# Patient Record
Sex: FEMALE | Race: BLACK OR AFRICAN AMERICAN | Employment: FULL TIME | URBAN - NONMETROPOLITAN AREA
[De-identification: names, ages, dates, MRNs, and addresses within clinical notes are randomized per-mention and may not be internally consistent; named-entity substitution may affect disease eponyms.]

---

## 2018-05-10 ENCOUNTER — HOSPITAL ENCOUNTER (OUTPATIENT)
Dept: OTHER | Age: 33
Discharge: HOME | End: 2018-05-10
Attending: CLINIC/CENTER | Admitting: CLINIC/CENTER

## 2018-05-12 LAB
CULTURE: NORMAL
REPORT STATUS: NORMAL
REQUEST PROBLEM: NORMAL
SPECIMEN: NORMAL
TOTAL COLONY COUNT: NORMAL

## 2019-04-08 ENCOUNTER — HOSPITAL ENCOUNTER (OUTPATIENT)
Age: 34
Discharge: HOME | End: 2019-04-08
Payer: COMMERCIAL

## 2019-04-08 LAB — D DIMER: <200 NG/ML(DDU)

## 2019-04-08 PROCEDURE — 36415 COLL VENOUS BLD VENIPUNCTURE: CPT

## 2019-04-08 PROCEDURE — 85379 FIBRIN DEGRADATION QUANT: CPT

## 2019-08-09 ENCOUNTER — HOSPITAL ENCOUNTER (OUTPATIENT)
Dept: ULTRASOUND IMAGING | Age: 34
Discharge: HOME | End: 2019-08-09
Payer: COMMERCIAL

## 2019-08-09 DIAGNOSIS — R19.7 DIARRHEA, UNSPECIFIED TYPE: ICD-10-CM

## 2019-08-09 DIAGNOSIS — R14.0 ABDOMINAL DISTENTION: ICD-10-CM

## 2019-08-09 PROCEDURE — 76705 ECHO EXAM OF ABDOMEN: CPT

## 2020-02-11 ENCOUNTER — HOSPITAL ENCOUNTER (OUTPATIENT)
Age: 35
Setting detail: SPECIMEN
Discharge: HOME | End: 2020-02-11

## 2020-02-11 PROCEDURE — 86787 VARICELLA-ZOSTER ANTIBODY: CPT

## 2020-02-11 PROCEDURE — 86481 TB AG RESPONSE T-CELL SUSP: CPT

## 2020-02-11 PROCEDURE — 86765 RUBEOLA ANTIBODY: CPT

## 2020-02-11 PROCEDURE — 86762 RUBELLA ANTIBODY: CPT

## 2020-02-11 PROCEDURE — 86735 MUMPS ANTIBODY: CPT

## 2020-02-14 LAB
MUV IGG SER QL: 9.6 AU/ML
MUV IGG SER QL: NORMAL AU/ML
NIL (NEGATIVE) SPOT CONTROL: NORMAL
PANEL A SPOT COUNT: 1
PANEL B SPOT COUNT: 2
POSITIVE CONTROL SPOT COUNT: NORMAL
POSITIVE CONTROL SPOT COUNT: NORMAL
RUBELLA ANTIBODY IGG: 16.1 IU/ML
RUBELLA ANTIBODY IGG: NORMAL IU/ML
RUBEOLA (MEASLES) AB IGG: 24.8 AU/ML
RUBEOLA (MEASLES) AB IGG: NORMAL AU/ML
TB CELL IMMUNE MEASURE: NORMAL
VARICELLA-ZOSTER VIRUS AB, IGG: 500 IV
VARICELLA-ZOSTER VIRUS AB, IGG: NORMAL IV

## 2021-03-03 ENCOUNTER — HOSPITAL ENCOUNTER (OUTPATIENT)
Age: 36
Setting detail: SPECIMEN
Discharge: HOME | End: 2021-03-03
Payer: COMMERCIAL

## 2021-03-03 PROCEDURE — U0002 COVID-19 LAB TEST NON-CDC: HCPCS

## 2021-03-03 PROCEDURE — C9803 HOPD COVID-19 SPEC COLLECT: HCPCS

## 2021-03-04 LAB
SARS-COV-2: NOT DETECTED
SOURCE: NORMAL

## 2021-03-30 ENCOUNTER — HOSPITAL ENCOUNTER (EMERGENCY)
Age: 36
Discharge: HOME | End: 2021-03-30
Attending: EMERGENCY MEDICINE
Payer: COMMERCIAL

## 2021-03-30 ENCOUNTER — NURSE TRIAGE (OUTPATIENT)
Dept: OTHER | Facility: CLINIC | Age: 36
End: 2021-03-30

## 2021-03-30 VITALS
HEIGHT: 64 IN | RESPIRATION RATE: 18 BRPM | SYSTOLIC BLOOD PRESSURE: 132 MMHG | TEMPERATURE: 98.1 F | OXYGEN SATURATION: 98 % | DIASTOLIC BLOOD PRESSURE: 84 MMHG | WEIGHT: 293 LBS | HEART RATE: 88 BPM | BODY MASS INDEX: 50.02 KG/M2

## 2021-03-30 DIAGNOSIS — S39.012A BACK STRAIN, INITIAL ENCOUNTER: Primary | ICD-10-CM

## 2021-03-30 PROCEDURE — 99285 EMERGENCY DEPT VISIT HI MDM: CPT

## 2021-03-30 PROCEDURE — 6370000000 HC RX 637 (ALT 250 FOR IP): Performed by: EMERGENCY MEDICINE

## 2021-03-30 RX ORDER — LIDOCAINE 4 G/G
1 PATCH TOPICAL DAILY
Status: DISCONTINUED | OUTPATIENT
Start: 2021-03-30 | End: 2021-03-30 | Stop reason: HOSPADM

## 2021-03-30 RX ORDER — LIDOCAINE 50 MG/G
1 PATCH TOPICAL DAILY
Qty: 10 PATCH | Refills: 1 | Status: SHIPPED | OUTPATIENT
Start: 2021-03-30

## 2021-03-30 ASSESSMENT — PAIN DESCRIPTION - PAIN TYPE: TYPE: ACUTE PAIN

## 2021-03-30 ASSESSMENT — PAIN SCALES - GENERAL: PAINLEVEL_OUTOF10: 9

## 2021-03-30 ASSESSMENT — PAIN DESCRIPTION - FREQUENCY: FREQUENCY: INTERMITTENT

## 2021-03-30 ASSESSMENT — PAIN DESCRIPTION - LOCATION: LOCATION: BACK

## 2021-03-30 ASSESSMENT — ENCOUNTER SYMPTOMS: BACK PAIN: 1

## 2021-03-30 ASSESSMENT — PAIN DESCRIPTION - DESCRIPTORS: DESCRIPTORS: ACHING

## 2021-03-30 NOTE — ED PROVIDER NOTES
Triage Chief Complaint:   Back Injury (helping a residence get dressed and felt a \"pull\" in lower back. Occurred at approx 6:30 am this morning.Xin. )    [place]:  Alisha Fernando is a 39 y.o. female that presents to the ED with acute low back pain. She states she was helping a resident get dressed when she pulled her back. Happened at 630 this morning. She checks in late this afternoon. Patient does have intermittent low back discomfort and sciatica currently she is having no sciatica no radiculopathy numbness tingling weakness no falls injury trauma no red flags. She actually points to her right mid T-spine right paraspinal region. Back Pain: RED FLAGS  Unexplained weigh loss: absent  Current infection: absent  Immunosuppression: absent  Fracture or suspected fracture: absent  MVA with suspicion of fracture: absent  Major Fall with suspicion of fracture: absent  Saddle anesthesia: absent  Recent onset bladder dysfunction: absent  Recent onset fecal incontinence: absent  Major motor weakness: absent  History of cancer: absent         Past Medical History:   Diagnosis Date    Asthma      History reviewed. No pertinent surgical history. History reviewed. No pertinent family history.   Social History     Socioeconomic History    Marital status: Single     Spouse name: Not on file    Number of children: Not on file    Years of education: Not on file    Highest education level: Not on file   Occupational History    Not on file   Social Needs    Financial resource strain: Not on file    Food insecurity     Worry: Not on file     Inability: Not on file    Transportation needs     Medical: Not on file     Non-medical: Not on file   Tobacco Use    Smoking status: Current Every Day Smoker     Packs/day: 0.50     Types: Cigarettes    Smokeless tobacco: Never Used   Substance and Sexual Activity    Alcohol use: No    Drug use: No    Sexual activity: Yes     Partners: Male   Lifestyle    Physical Head: Normocephalic and atraumatic. Eyes:      Pupils: Pupils are equal, round, and reactive to light. Neck:      Musculoskeletal: Normal range of motion and neck supple. Musculoskeletal:      Cervical back: Normal.      Thoracic back: She exhibits decreased range of motion and tenderness. She exhibits no bony tenderness, no pain and no spasm. Lumbar back: Normal.   Skin:     General: Skin is warm and dry. Neurological:      Mental Status: She is alert and oriented to person, place, and time. Cranial Nerves: Cranial nerves are intact. Sensory: Sensation is intact. Motor: Motor function is intact. Coordination: Coordination is intact. Gait: Gait is intact. Deep Tendon Reflexes:      Reflex Scores:       Patellar reflexes are 2+ on the right side. Achilles reflexes are 2+ on the right side and 2+ on the left side. I have reviewed and interpreted all of the currently available lab results from this visit (ifapplicable):  No results found for this visit on 03/30/21. Radiographs (if obtained):  [] The following radiograph wasinterpreted by myself in the absence of a radiologist:   [] Radiologist's Report Reviewed:  No orders to display         EKG (if obtained): (All EKG's are interpreted by myself in the absence of a cardiologist)    Chart review shows recent radiographs:  No results found. MDM:      Patient has acute pain in her right mid to low paraspinal T-spine region. There is no midline tenderness. Reflexes are normal no clonus no radiculopathy intact sensation full motor testing L1 S2 is normal.  I recommend a short course of rest ice topical Lidoderm patch 1 dose in the ED and a prescription to go home and follow-up with our health           Clinical Impression:  1.  Back strain, initial encounter      Disposition referral (if applicable):  Marci Cuevas DO  [address] [place] [address]  [phone]    Schedule an appointment as soon as possible

## 2021-03-30 NOTE — ED NOTES
Pt tearful about returning to work. Discussed discharge information and follow up with occupational health, discussed prescriptions. Pt verbalized understanding.       Angie Beck, RN  03/30/21 9291

## 2023-03-19 ENCOUNTER — HOSPITAL ENCOUNTER (EMERGENCY)
Age: 38
Discharge: HOME | End: 2023-03-19
Attending: EMERGENCY MEDICINE
Payer: COMMERCIAL

## 2023-03-19 VITALS
WEIGHT: 240 LBS | BODY MASS INDEX: 42.52 KG/M2 | HEIGHT: 63 IN | RESPIRATION RATE: 18 BRPM | SYSTOLIC BLOOD PRESSURE: 168 MMHG | OXYGEN SATURATION: 98 % | TEMPERATURE: 98.4 F | HEART RATE: 99 BPM | DIASTOLIC BLOOD PRESSURE: 113 MMHG

## 2023-03-19 DIAGNOSIS — S16.1XXA STRAIN OF NECK MUSCLE, INITIAL ENCOUNTER: Primary | ICD-10-CM

## 2023-03-19 PROCEDURE — 6360000002 HC RX W HCPCS: Performed by: EMERGENCY MEDICINE

## 2023-03-19 PROCEDURE — 99284 EMERGENCY DEPT VISIT MOD MDM: CPT

## 2023-03-19 PROCEDURE — 6370000000 HC RX 637 (ALT 250 FOR IP): Performed by: EMERGENCY MEDICINE

## 2023-03-19 PROCEDURE — 96372 THER/PROPH/DIAG INJ SC/IM: CPT

## 2023-03-19 RX ORDER — KETOROLAC TROMETHAMINE 30 MG/ML
30 INJECTION, SOLUTION INTRAMUSCULAR; INTRAVENOUS ONCE
Status: COMPLETED | OUTPATIENT
Start: 2023-03-19 | End: 2023-03-19

## 2023-03-19 RX ORDER — CYCLOBENZAPRINE HCL 10 MG
10 TABLET ORAL ONCE
Status: COMPLETED | OUTPATIENT
Start: 2023-03-19 | End: 2023-03-19

## 2023-03-19 RX ORDER — CYCLOBENZAPRINE HCL 10 MG
10 TABLET ORAL 3 TIMES DAILY PRN
Qty: 10 TABLET | Refills: 0 | Status: SHIPPED | OUTPATIENT
Start: 2023-03-19

## 2023-03-19 RX ORDER — ACETAMINOPHEN 500 MG
1000 TABLET ORAL ONCE
Status: COMPLETED | OUTPATIENT
Start: 2023-03-19 | End: 2023-03-19

## 2023-03-19 RX ADMIN — ACETAMINOPHEN 1000 MG: 500 TABLET ORAL at 19:34

## 2023-03-19 RX ADMIN — CYCLOBENZAPRINE 10 MG: 10 TABLET, FILM COATED ORAL at 19:34

## 2023-03-19 RX ADMIN — KETOROLAC TROMETHAMINE 30 MG: 30 INJECTION, SOLUTION INTRAMUSCULAR; INTRAVENOUS at 19:33

## 2023-03-19 ASSESSMENT — PAIN SCALES - GENERAL: PAINLEVEL_OUTOF10: 7

## 2023-03-19 NOTE — ED PROVIDER NOTES
Triage Chief Complaint:   Neck Pain (Since Monday NKI. Pt repotrs turning neck and feeling a \"crunch. \" )    [place]:  Judge Lucero is a 45 y.o. female that presents with left posterior neck pain. The patient states that she turned her neck 5 days ago and felt a crunch in the left posterior portion of her neck with pain. States that since then she has had pain with any type of movement of her neck especially with leftward rotation. Denies vision changes, motor or sensory deficits, headache or back pain. States that she felt somewhat lightheaded this morning but that resolved. No other acute complaints. She has been taking ibuprofen at home. ROS:  At least 10 systems reviewed and otherwise acutely negative except as in the [address] Past Medical History:   Diagnosis Date    Asthma      History reviewed. No pertinent surgical history. History reviewed. No pertinent family history. Social History     Socioeconomic History    Marital status: Single     Spouse name: Not on file    Number of children: Not on file    Years of education: Not on file    Highest education level: Not on file   Occupational History    Not on file   Tobacco Use    Smoking status: Every Day     Packs/day: 0.50     Types: Cigarettes    Smokeless tobacco: Never   Vaping Use    Vaping Use: Never used   Substance and Sexual Activity    Alcohol use: No    Drug use: No    Sexual activity: Yes     Partners: Male   Other Topics Concern    Not on file   Social History Narrative    Not on file     Social Determinants of Health     Financial Resource Strain: Not on file   Food Insecurity: Not on file   Transportation Needs: Not on file   Physical Activity: Not on file   Stress: Not on file   Social Connections: Not on file   Intimate Partner Violence: Not on file   Housing Stability: Not on file     No current facility-administered medications for this encounter.      Current Outpatient Medications   Medication Sig Dispense Refill

## 2023-08-21 ENCOUNTER — HOSPITAL ENCOUNTER (OUTPATIENT)
Dept: GENERAL RADIOLOGY | Age: 38
Discharge: HOME | End: 2023-08-21
Payer: COMMERCIAL

## 2023-08-21 ENCOUNTER — HOSPITAL ENCOUNTER (OUTPATIENT)
Age: 38
Discharge: HOME | End: 2023-08-21
Payer: COMMERCIAL

## 2023-08-21 DIAGNOSIS — M54.9 DORSALGIA: ICD-10-CM

## 2023-08-21 DIAGNOSIS — M41.9 SCOLIOSIS, UNSPECIFIED SCOLIOSIS TYPE, UNSPECIFIED SPINAL REGION: ICD-10-CM

## 2023-08-21 DIAGNOSIS — M25.559 HIP PAIN, UNSPECIFIED LATERALITY: ICD-10-CM

## 2023-08-21 DIAGNOSIS — M25.552 HIP PAIN, LEFT: ICD-10-CM

## 2023-08-21 DIAGNOSIS — M25.551 HIP PAIN, RIGHT: ICD-10-CM

## 2023-08-21 PROCEDURE — 73502 X-RAY EXAM HIP UNI 2-3 VIEWS: CPT

## 2023-08-21 PROCEDURE — 72100 X-RAY EXAM L-S SPINE 2/3 VWS: CPT

## 2023-08-21 PROCEDURE — 72072 X-RAY EXAM THORAC SPINE 3VWS: CPT

## 2023-08-21 PROCEDURE — 72040 X-RAY EXAM NECK SPINE 2-3 VW: CPT

## 2023-09-13 ENCOUNTER — APPOINTMENT (OUTPATIENT)
Dept: CT IMAGING | Age: 38
End: 2023-09-13
Attending: EMERGENCY MEDICINE
Payer: COMMERCIAL

## 2023-09-13 ENCOUNTER — HOSPITAL ENCOUNTER (EMERGENCY)
Age: 38
Discharge: HOME | End: 2023-09-13
Attending: EMERGENCY MEDICINE
Payer: COMMERCIAL

## 2023-09-13 VITALS
RESPIRATION RATE: 14 BRPM | BODY MASS INDEX: 43.4 KG/M2 | SYSTOLIC BLOOD PRESSURE: 137 MMHG | DIASTOLIC BLOOD PRESSURE: 87 MMHG | OXYGEN SATURATION: 97 % | HEART RATE: 81 BPM | WEIGHT: 245 LBS | TEMPERATURE: 98.6 F

## 2023-09-13 DIAGNOSIS — R42 VERTIGO: Primary | ICD-10-CM

## 2023-09-13 LAB
ALBUMIN SERPL-MCNC: 4.1 GM/DL (ref 3.4–5)
ALP BLD-CCNC: 83 IU/L (ref 40–129)
ALT SERPL-CCNC: 15 U/L (ref 10–40)
ANION GAP SERPL CALCULATED.3IONS-SCNC: 8 MMOL/L (ref 4–16)
AST SERPL-CCNC: 15 IU/L (ref 15–37)
BASOPHILS ABSOLUTE: 0 K/CU MM
BASOPHILS RELATIVE PERCENT: 0.3 % (ref 0–1)
BILIRUB SERPL-MCNC: 0.2 MG/DL (ref 0–1)
BILIRUBIN URINE: NEGATIVE MG/DL
BLOOD, URINE: NEGATIVE
BUN SERPL-MCNC: 14 MG/DL (ref 6–23)
CALCIUM SERPL-MCNC: 9 MG/DL (ref 8.3–10.6)
CHLORIDE BLD-SCNC: 106 MMOL/L (ref 99–110)
CLARITY: CLEAR
CO2: 26 MMOL/L (ref 21–32)
COLOR: YELLOW
COMMENT UA: NORMAL
CREAT SERPL-MCNC: 0.6 MG/DL (ref 0.6–1.1)
DIFFERENTIAL TYPE: ABNORMAL
EOSINOPHILS ABSOLUTE: 0.2 K/CU MM
EOSINOPHILS RELATIVE PERCENT: 3.3 % (ref 0–3)
GFR SERPL CREATININE-BSD FRML MDRD: >60 ML/MIN/1.73M2
GLUCOSE SERPL-MCNC: 110 MG/DL (ref 70–99)
GLUCOSE, URINE: NEGATIVE MG/DL
GONADOTROPIN, CHORIONIC (HCG) QUANT: <1 UIU/ML
HCT VFR BLD CALC: 41.7 % (ref 37–47)
HEMOGLOBIN: 13.2 GM/DL (ref 12.5–16)
IMMATURE NEUTROPHIL %: 0.2 % (ref 0–0.43)
KETONES, URINE: NEGATIVE MG/DL
LEUKOCYTE ESTERASE, URINE: NEGATIVE
LYMPHOCYTES ABSOLUTE: 1.4 K/CU MM
LYMPHOCYTES RELATIVE PERCENT: 23.3 % (ref 24–44)
MCH RBC QN AUTO: 26.5 PG (ref 27–31)
MCHC RBC AUTO-ENTMCNC: 31.7 % (ref 32–36)
MCV RBC AUTO: 83.6 FL (ref 78–100)
MONOCYTES ABSOLUTE: 0.6 K/CU MM
MONOCYTES RELATIVE PERCENT: 9.2 % (ref 0–4)
NITRITE URINE, QUANTITATIVE: NEGATIVE
PDW BLD-RTO: 15.5 % (ref 11.7–14.9)
PH, URINE: 6 (ref 5–8)
PLATELET # BLD: 346 K/CU MM (ref 140–440)
PMV BLD AUTO: 9.5 FL (ref 7.5–11.1)
POTASSIUM SERPL-SCNC: 3.7 MMOL/L (ref 3.5–5.1)
PROTEIN UA: NEGATIVE MG/DL
RBC # BLD: 4.99 M/CU MM (ref 4.2–5.4)
SEGMENTED NEUTROPHILS ABSOLUTE COUNT: 3.9 K/CU MM
SEGMENTED NEUTROPHILS RELATIVE PERCENT: 63.7 % (ref 36–66)
SODIUM BLD-SCNC: 140 MMOL/L (ref 135–145)
SPECIFIC GRAVITY UA: 1.01 (ref 1–1.03)
TOTAL IMMATURE NEUTOROPHIL: 0.01 K/CU MM
TOTAL PROTEIN: 6.6 GM/DL (ref 6.4–8.2)
UROBILINOGEN, URINE: 0.2 MG/DL (ref 0.2–1)
WBC # BLD: 6.1 K/CU MM (ref 4–10.5)

## 2023-09-13 PROCEDURE — 6370000000 HC RX 637 (ALT 250 FOR IP): Performed by: EMERGENCY MEDICINE

## 2023-09-13 PROCEDURE — 6360000004 HC RX CONTRAST MEDICATION: Performed by: EMERGENCY MEDICINE

## 2023-09-13 PROCEDURE — 70498 CT ANGIOGRAPHY NECK: CPT

## 2023-09-13 PROCEDURE — 70450 CT HEAD/BRAIN W/O DYE: CPT

## 2023-09-13 PROCEDURE — 80053 COMPREHEN METABOLIC PANEL: CPT

## 2023-09-13 PROCEDURE — 81003 URINALYSIS AUTO W/O SCOPE: CPT

## 2023-09-13 PROCEDURE — 84702 CHORIONIC GONADOTROPIN TEST: CPT

## 2023-09-13 PROCEDURE — 99285 EMERGENCY DEPT VISIT HI MDM: CPT

## 2023-09-13 PROCEDURE — 93005 ELECTROCARDIOGRAM TRACING: CPT | Performed by: EMERGENCY MEDICINE

## 2023-09-13 PROCEDURE — 85025 COMPLETE CBC W/AUTO DIFF WBC: CPT

## 2023-09-13 RX ORDER — MECLIZINE HYDROCHLORIDE 25 MG/1
25 TABLET ORAL ONCE
Status: COMPLETED | OUTPATIENT
Start: 2023-09-13 | End: 2023-09-13

## 2023-09-13 RX ORDER — AMLODIPINE BESYLATE 5 MG/1
5 TABLET ORAL DAILY
COMMUNITY
Start: 2023-08-09

## 2023-09-13 RX ORDER — MECLIZINE HYDROCHLORIDE 25 MG/1
25 TABLET ORAL 3 TIMES DAILY PRN
Qty: 30 TABLET | Refills: 0 | Status: SHIPPED | OUTPATIENT
Start: 2023-09-13 | End: 2023-09-23

## 2023-09-13 RX ORDER — OMEPRAZOLE 40 MG/1
40 CAPSULE, DELAYED RELEASE ORAL DAILY
COMMUNITY
Start: 2021-06-17

## 2023-09-13 RX ORDER — TRAMADOL HYDROCHLORIDE 50 MG/1
50 TABLET ORAL 3 TIMES DAILY PRN
COMMUNITY
Start: 2023-08-21

## 2023-09-13 RX ORDER — ACETAMINOPHEN 160 MG
2000 TABLET,DISINTEGRATING ORAL DAILY
COMMUNITY
Start: 2023-06-21

## 2023-09-13 RX ADMIN — MECLIZINE HYDROCHLORIDE 25 MG: 25 TABLET ORAL at 17:25

## 2023-09-13 RX ADMIN — IOPAMIDOL 100 ML: 755 INJECTION, SOLUTION INTRAVENOUS at 18:38

## 2023-09-13 NOTE — ED NOTES
Resting quietly. States feels no different. No further complaint voiced.       Pacheco Sharp RN  09/13/23 8987

## 2023-09-13 NOTE — ED PROVIDER NOTES
EMERGENCY DEPARTMENT ENCOUNTER      CHIEF COMPLAINT:   Vertigo  Nausea    HPI: Chance Escalante is a 45 y.o. female who presents to the emergency department, complaining of vertigo associated with nausea. Patient states that she has had several similar episodes over the past few weeks. She states that she bent over today and when she is back up she felt very dizzy. She felt unsteady on her feet. It was associated with nausea. She states that at times she i has been feeling fatigued and has had having difficulty finding her words. This is currently resolved. She denies any recent cough or cold symptoms. She did have dental pain on the right side and dental work and states that this made her ear hurt. She has never had a stroke. She denies any history of hypertension, hyperlipidemia or diabetes. She denies palpitations. She denies confusion, slurred speech, blurred vision/double/weakness. She denies any other complaints    REVIEW OF SYSTEMS:   Constitutional:  Denies fever or chills  Eyes:  Denies change in visual acuity  HENT:  Denies nasal congestion or sore throat  Respiratory:  Denies cough or shortness of breath  Cardiovascular:  Denies chest pain or edema  GI:  Denies abdominal pain, nausea, vomiting, bloody stools or diarrhea  :  Denies dysuria  Musculoskeletal:  Denies back pain or joint pain  Integument:  Denies rash  Neurologic:  Denies headache, focal weakness or sensory changes  \"Remaining review of systems reviewed and negative. I have reviewed the nursing triage documentation and agree unless otherwise noted below. \"      PAST MEDICAL HISTORY:   Past Medical History:   Diagnosis Date    Asthma        CURRENT MEDICATIONS:   Home medications reviewed. SURGICAL HISTORY:   No past surgical history on file. FAMILY HISTORY:   No family history on file.     SOCIAL HISTORY:   Social History     Socioeconomic History    Marital status: Single     Spouse name: Not on file    Number of children: [id number]  [phone]      ENT    [facility] Emergency Department  [place] [address]  [phone]  Go to   If symptoms worsen      Disposition medications (if applicable):  Discharge Medication List as of 9/13/2023  7:51 PM        START taking these medications    Details   meclizine (ANTIVERT) 25 MG tablet Take 1 tablet by mouth 3 times daily as needed for Dizziness, Disp-30 tablet, R-0Normal               Comment: Please note this report has been produced using speech recognition software and may contain errors related to that system including errors in grammar, punctuation, and spelling, as well as words and phrases that may be inappropriate. If there are any questions or concerns please feel free to contact the dictating provider for clarification.        Vicki Wasserman MD  09/15/23 8318

## 2023-09-13 NOTE — ED NOTES
Ambulatory to BR to void. Tolerated without difficulty. UA obtained.       Sumi Arias RN  09/13/23 2894

## 2023-09-14 LAB
EKG ATRIAL RATE: 75 BPM
EKG DIAGNOSIS: NORMAL
EKG P AXIS: 54 DEGREES
EKG P-R INTERVAL: 168 MS
EKG Q-T INTERVAL: 400 MS
EKG QRS DURATION: 88 MS
EKG QTC CALCULATION (BAZETT): 446 MS
EKG R AXIS: 3 DEGREES
EKG T AXIS: 32 DEGREES
EKG VENTRICULAR RATE: 75 BPM

## 2023-09-14 PROCEDURE — 93010 ELECTROCARDIOGRAM REPORT: CPT | Performed by: INTERNAL MEDICINE

## 2024-03-07 ENCOUNTER — HOSPITAL ENCOUNTER (EMERGENCY)
Age: 39
Discharge: HOME | End: 2024-03-07
Attending: EMERGENCY MEDICINE
Payer: COMMERCIAL

## 2024-03-07 VITALS
TEMPERATURE: 97.9 F | HEIGHT: 63 IN | BODY MASS INDEX: 49.61 KG/M2 | RESPIRATION RATE: 20 BRPM | SYSTOLIC BLOOD PRESSURE: 156 MMHG | DIASTOLIC BLOOD PRESSURE: 120 MMHG | HEART RATE: 127 BPM | OXYGEN SATURATION: 95 % | WEIGHT: 280 LBS

## 2024-03-07 DIAGNOSIS — F32.1 CURRENT MODERATE EPISODE OF MAJOR DEPRESSIVE DISORDER, UNSPECIFIED WHETHER RECURRENT (HCC): Primary | ICD-10-CM

## 2024-03-07 PROCEDURE — G0480 DRUG TEST DEF 1-7 CLASSES: HCPCS

## 2024-03-07 PROCEDURE — 99283 EMERGENCY DEPT VISIT LOW MDM: CPT

## 2024-03-07 RX ORDER — FUROSEMIDE 40 MG/1
20 TABLET ORAL DAILY PRN
COMMUNITY
Start: 2024-01-08

## 2024-03-07 RX ORDER — BUPROPION HYDROCHLORIDE 300 MG/1
300 TABLET ORAL EVERY MORNING
COMMUNITY
Start: 2024-02-06

## 2024-03-07 ASSESSMENT — PAIN - FUNCTIONAL ASSESSMENT
PAIN_FUNCTIONAL_ASSESSMENT: NONE - DENIES PAIN
PAIN_FUNCTIONAL_ASSESSMENT: NONE - DENIES PAIN

## 2024-03-07 ASSESSMENT — LIFESTYLE VARIABLES
HOW MANY STANDARD DRINKS CONTAINING ALCOHOL DO YOU HAVE ON A TYPICAL DAY: PATIENT DOES NOT DRINK
HOW OFTEN DO YOU HAVE A DRINK CONTAINING ALCOHOL: NEVER

## 2024-03-07 NOTE — ED PROVIDER NOTES
unexpected weight gain, no unexpected weight loss  Extremities:  no edema, no pain    Past Medical History:   Diagnosis Date    Asthma      History reviewed. No pertinent surgical history.  History reviewed. No pertinent family history.  Social History     Socioeconomic History    Marital status: Single     Spouse name: Not on file    Number of children: Not on file    Years of education: Not on file    Highest education level: Not on file   Occupational History    Not on file   Tobacco Use    Smoking status: Every Day     Current packs/day: 0.50     Types: Cigarettes    Smokeless tobacco: Never   Vaping Use    Vaping Use: Never used   Substance and Sexual Activity    Alcohol use: No    Drug use: No    Sexual activity: Yes     Partners: Male   Other Topics Concern    Not on file   Social History Narrative    Not on file     Social Determinants of Health     Financial Resource Strain: Not on file   Food Insecurity: Not on file   Transportation Needs: Not on file   Physical Activity: Not on file   Stress: Not on file   Social Connections: Not on file   Intimate Partner Violence: Not on file   Housing Stability: Not on file     No current facility-administered medications for this encounter.     Current Outpatient Medications   Medication Sig Dispense Refill    buPROPion (WELLBUTRIN XL) 300 MG extended release tablet Take 1 tablet by mouth every morning      furosemide (LASIX) 40 MG tablet Take 0.5 tablets by mouth daily as needed      amLODIPine (NORVASC) 5 MG tablet Take 1 tablet by mouth daily      Cholecalciferol (VITAMIN D3) 50 MCG (2000 UT) CAPS Take 1 capsule by mouth daily      omeprazole (PRILOSEC) 40 MG delayed release capsule Take 1 capsule by mouth daily      cyclobenzaprine (FLEXERIL) 10 MG tablet Take 1 tablet by mouth 3 times daily as needed for Muscle spasms 10 tablet 0    lidocaine (LIDODERM) 5 % Place 1 patch onto the skin daily 12 hours on, 12 hours off. (Patient not taking: Reported on 9/13/2023) 10

## 2024-03-07 NOTE — VIRTUAL HEALTH
cyclobenzaprine (FLEXERIL) 10 MG tablet Take 1 tablet by mouth 3 times daily as needed for Muscle spasms 3/19/23   Tk Saeed MD   lidocaine (LIDODERM) 5 % Place 1 patch onto the skin daily 12 hours on, 12 hours off.  Patient not taking: Reported on 9/13/2023 3/30/21   Toi Amaya DO   acetaminophen (AMINOFEN) 325 MG tablet Take 2 tablets by mouth every 6 hours as needed for Pain 10/11/17   Henrik Aparicio MD   ibuprofen (ADVIL;MOTRIN) 800 MG tablet Take 1 tablet by mouth every 6 hours as needed for Pain 10/11/17   Henrik Aparicio MD   NONFORMULARY Pt has an IUD, Paraguard    Provider, MD Ryan        Labs:  UDS: not available  ETOH: not available  HCG: Unknown      Mental Status Exam:  Level of consciousness:  within normal limits   Appearance:  well-appearing.  Does appear stated age. No acute distress.  Behavior/Motor:  no abnormalities noted  Attitude toward examiner:  cooperative  SI/HI:Denies SI/HI  Speech:  normal rate and normal volume , Tone: normal tone  Mood: sad  Affect: anxious  Thought Processes:  slow.   Thought Content: No delusions or other perceptual abnormalities  Hallucinations:  Hallucinations: Denies AVOT-H  Cognition:  oriented to person, place, and time   Concentration: intact  Memory: intact, though not formally tested.  Insight: good   Judgement: good   Fund of Knowledge: good    Overall Level Suicide Risk: TelePsych CSSRS Risk Level: Low Risk  CSSR-S Screening: Patient reported no plan or intent to kill herself, patient reported that she has been struggling with depression for some time, and that she is in need of outpatient services. Patient reported that she is able to keep herself safe.     Brief ClinicalSummary:   Patient is a 39 y.o.  female who presents for depression.    Patient reported that she is having difficulties because her son is bipolar, and he just had a baby and that he has been stressing out the patient, patient reported that she has to find a

## 2024-03-07 NOTE — DISCHARGE INSTR - COC
NO:}  Urinary Catheter: {Urinary Catheter:542934537}   Colostomy/Ileostomy/Ileal Conduit: {YES / NO:}       Date of Last BM: ***  No intake or output data in the 24 hours ending 24 1535  No intake/output data recorded.    Safety Concerns:     { BUDDY Safety Concerns:281634471}    Impairments/Disabilities:      { BUDDY Impairments/Disabilities:126893360}    Nutrition Therapy:  Current Nutrition Therapy:   { BUDDY Diet List:008389495}    Routes of Feeding: {[facility] DME Other Feedings:883793145}  Liquids: {Slp liquid thickness:26453}  Daily Fluid Restriction: {[facility] DME Yes amt example:771016968}  Last Modified Barium Swallow with Video (Video Swallowing Test): {Done Not Done Date:}    Treatments at the Time of Hospital Discharge:   Respiratory Treatments: ***  Oxygen Therapy:  {Therapy; copd oxygen:80370}  Ventilator:    { CC Vent List:477493595}    Rehab Therapies: {THERAPEUTIC INTERVENTION:3296990137}  Weight Bearing Status/Restrictions: {[facility] Weight Bearin}  Other Medical Equipment (for information only, NOT a DME order):  {EQUIPMENT:430113838}  Other Treatments: ***    Patient's personal belongings (please select all that are sent with patient):  {[facility] DME Belongings:226821355}    RN SIGNATURE:  {Esignature:647424429}    CASE MANAGEMENT/SOCIAL WORK SECTION    Inpatient Status Date: ***    Readmission Risk Assessment Score:  Readmission Risk              Risk of Unplanned Readmission:  0           Discharging to Facility/ Agency   Name:   Address:  Phone:  Fax:    Dialysis Facility (if applicable)   Name:  Address:  Dialysis Schedule:  Phone:  Fax:    / signature: {Esignature:803051234}    PHYSICIAN SECTION    Prognosis: {Prognosis:4570519121}    Condition at Discharge: { Patient Condition:849901739}    Rehab Potential (if transferring to Rehab): {Prognosis:5361833908}    Recommended Labs or Other Treatments After Discharge: ***    Physician Certification:

## 2024-04-01 ENCOUNTER — TELEPHONE (OUTPATIENT)
Dept: OTHER | Age: 39
End: 2024-04-01

## 2024-04-01 NOTE — TELEPHONE ENCOUNTER
Patient was referred to the CP program by ED RN. Patient was in the ED and expressed she needed to find other housing. Reached out to patient today to see if I could help. Patient states she has already found housing and denies any other needs at this time. Will remove from the program. Should any needs arise or another referral received another file will be opened.

## 2024-04-25 NOTE — ED NOTES
Discharged with instructions and rx x 1. Pt acknowledges understanding. Ambulatory at discharge.       Vasiliy Ortega, AN  03/19/23 9203 Patient Education       Well Child Exam 18 Months   About this topic   Your child's 18-month well child exam is a visit with the doctor to check your child's health. The doctor measures your child's weight, height, and head size. The doctor plots these numbers on a growth curve. The growth curve gives a picture of your child's growth at each visit. The doctor may listen to your child's heart, lungs, and belly. Your doctor will do a full exam of your child from the head to the toes.  Your child may also need shots or blood tests during this visit.  General   Growth and Development   Your doctor will ask you how your child is developing. The doctor will focus on the skills that most children your child's age are expected to do. During this time of your child's life, here are some things you can expect.  Movement - Your child may:  Walk up steps and run  Use a crayon to scribble or make marks  Explore places and things  Throw a ball  Begin to undress themselves  Imitate your actions  Hearing, seeing, and talking - Your child will likely:  Have 10 or 20 words  Point to something interesting to show others  Know one body part  Point to familiar objects or characters in a book  Be able to match pairs of objects  Feeling and behavior - Your child will likely:  Want your love and praise. Hug your child and say I love you often. Say thank you when your child does something nice.  Begin to understand no. Try to use distraction if your child is doing something you do not want them to do.  Begin to have temper tantrums. Ignore them if possible.  Become more stubborn. Your child may shake the head no often. Try to help by giving your child words for feelings.  Play alongside other children.  Be afraid of strangers or cry when you leave.  Feeding - Your child:  Should drink whole milk until 2 years old  Is ready to drink from a cup and may be ready to use a spoon or toddler fork  Will be eating 3 meals and 2 to 3 snacks a day.  However, your child may eat less than before and this is normal.  Should be given a variety of healthy foods and textures. Let your child decide how much to eat.  Should avoid foods that might cause choking like grapes, popcorn, hot dogs, or hard candy.  Should have no more than 4 ounces (120 mL) of fruit juice a day  Will need you to clean the teeth 2 times each day with a child's toothbrush and a smear of toothpaste with fluoride in it.  Sleep - Your child:  Should still sleep in a safe crib. Your child may be ready to sleep in a toddler bed if climbing out of the crib after naps or in the morning.  Is likely sleeping about 10 to 12 hours in a row at night  Most often takes 1 nap each day  Sleeps about a total of 14 hours each day  Should be able to fall asleep without help. If your child wakes up at night, check on your child. Do not pick your child up, offer a bottle, or play with your child. Doing these things will not help your child fall asleep without help.  Should not have a bottle in bed. This can cause tooth decay or ear infections.  Vaccines - It is important for your child to get shots on time. This protects from very serious illnesses like lung infections, meningitis, or infections that harm the nervous system. Your child may also need a flu shot. Check with your doctor to make sure your child's shots are up to date. Your child may need:  DTaP or diphtheria, tetanus, and pertussis vaccine  IPV or polio vaccine  Hep A or hepatitis A vaccine  Hep B or hepatitis B vaccine  Flu or influenza vaccine  Your child may get some of these combined into one shot. This lowers the number of shots your child may get and yet keeps them protected.  Help for Parents   Play with your child.  Go outside as often as you can.  Give your child pots, pans, and spoons or a toy vacuum. Children love to imitate what you are doing.  Cars, trains, and toys to push, pull, or walk behind are fun for this age child. So are puzzles  and animal or people figures.  Help your child pretend. Use an empty cup to take a drink. Push a block and make sounds like it is a car or a boat.  Read to your child. Name the things in the pictures in the book. Talk and sing to your child. This helps your child learn language skills.  Give your child crayons and paper to draw or color on.  Here are some things you can do to help keep your child safe and healthy.  Do not allow anyone to smoke in your home or around your child.  Have the right size car seat for your child and use it every time your child is in the car. Your child should be rear facing until at least 2 years of age or longer.  Be sure furniture, shelves, and televisions are secure and cannot tip over and hurt your child.  Take extra care around water. Close bathroom doors. Never leave your child in the tub alone.  Never leave your child alone. Do not leave your child in the car, in the bath, or at home alone, even for a few minutes.  Avoid long exposure to direct sunlight by keeping your child in the shade. Use sunscreen if shade is not possible.  Protect your child from gun injuries. If you have a gun, use a trigger lock. Keep the gun locked up and the bullets kept in a separate place.  Avoid screen time for children under 2 years old. This means no TV, computers, or video games. They can cause problems with brain development.  Parents need to think about:  Having emergency numbers, including poison control, in your phone or posted near the phone  How to distract your child when doing something you dont want your child to do  Using positive words to tell your child what you want, rather than saying no or what not to do  Watch for signs that your child is ready for potty training, including showing interest in the potty and staying dry for longer periods.  Your next well child visit will most likely be when your child is 2 years old. At this visit your doctor may:  Do a full check up on your  child  Talk about limiting screen time for your child, how well your child is eating, and signs it may be time to start potty training  Talk about discipline and how to correct your child  Give your child the next set of shots  When do I need to call the doctor?   Fever of 100.4°F (38°C) or higher  Has trouble walking or only walks on the toes  Has trouble speaking or following simple instructions  You are worried about your child's development  Where can I learn more?   Centers for Disease Control and Prevention  https://www.cdc.gov/ncbddd/actearly/milestones/milestones-18mo.html   Last Reviewed Date   2021-09-17  Consumer Information Use and Disclaimer   This information is not specific medical advice and does not replace information you receive from your health care provider. This is only a brief summary of general information. It does NOT include all information about conditions, illnesses, injuries, tests, procedures, treatments, therapies, discharge instructions or life-style choices that may apply to you. You must talk with your health care provider for complete information about your health and treatment options. This information should not be used to decide whether or not to accept your health care providers advice, instructions or recommendations. Only your health care provider has the knowledge and training to provide advice that is right for you.  Copyright   Copyright © 2021 [employer], [employer] and its affiliates and/or licensors. All rights reserved.    If you have an active [facility] account, please look for your well child questionnaire to come to your [facility] account before your next well child visit.  Children under the age of 2 years will be restrained in a rear facing child safety seat.

## 2025-01-09 ENCOUNTER — HOSPITAL ENCOUNTER (EMERGENCY)
Age: 40
Discharge: HOME | End: 2025-01-09
Attending: STUDENT IN AN ORGANIZED HEALTH CARE EDUCATION/TRAINING PROGRAM
Payer: COMMERCIAL

## 2025-01-09 VITALS
HEIGHT: 63 IN | TEMPERATURE: 98.2 F | BODY MASS INDEX: 51.91 KG/M2 | SYSTOLIC BLOOD PRESSURE: 130 MMHG | HEART RATE: 93 BPM | RESPIRATION RATE: 20 BRPM | WEIGHT: 293 LBS | OXYGEN SATURATION: 98 % | DIASTOLIC BLOOD PRESSURE: 67 MMHG

## 2025-01-09 DIAGNOSIS — R53.1 GENERALIZED WEAKNESS: ICD-10-CM

## 2025-01-09 DIAGNOSIS — R52 BODY ACHES: Primary | ICD-10-CM

## 2025-01-09 LAB
ALBUMIN SERPL-MCNC: 4.2 G/DL (ref 3.4–5)
ALBUMIN/GLOB SERPL: 1.4 {RATIO} (ref 1.1–2.2)
ALP SERPL-CCNC: 99 U/L (ref 40–129)
ALT SERPL-CCNC: 13 U/L (ref 10–40)
ANION GAP SERPL CALCULATED.3IONS-SCNC: 16 MMOL/L (ref 4–16)
AST SERPL-CCNC: 22 U/L (ref 15–37)
B-HCG SERPL EIA 3RD IS-ACNC: <1 MIU/ML
BACTERIA URNS QL MICRO: ABNORMAL
BASOPHILS # BLD: 0.04 K/UL
BASOPHILS NFR BLD: 0 % (ref 0–1)
BILIRUB SERPL-MCNC: 0.5 MG/DL (ref 0–1)
BILIRUB UR QL STRIP: NEGATIVE
BUN SERPL-MCNC: 13 MG/DL (ref 6–23)
CALCIUM SERPL-MCNC: 8.7 MG/DL (ref 8.3–10.6)
CHARACTER UR: ABNORMAL
CHLORIDE SERPL-SCNC: 103 MMOL/L (ref 99–110)
CLARITY UR: CLEAR
CO2 SERPL-SCNC: 20 MMOL/L (ref 21–32)
COLOR UR: YELLOW
CREAT SERPL-MCNC: 0.6 MG/DL (ref 0.6–1.1)
EOSINOPHIL # BLD: 0.17 K/UL
EOSINOPHILS RELATIVE PERCENT: 2 % (ref 0–3)
EPI CELLS #/AREA URNS HPF: ABNORMAL /HPF
ERYTHROCYTE [DISTWIDTH] IN BLOOD BY AUTOMATED COUNT: 14.4 % (ref 11.7–14.9)
GFR, ESTIMATED: >90 ML/MIN/1.73M2
GLUCOSE BLD-MCNC: 100 MG/DL (ref 74–99)
GLUCOSE SERPL-MCNC: 103 MG/DL (ref 70–99)
GLUCOSE UR STRIP-MCNC: NEGATIVE MG/DL
HCT VFR BLD AUTO: 46.4 % (ref 37–47)
HGB BLD-MCNC: 15 G/DL (ref 12.5–16)
HGB UR QL STRIP.AUTO: NEGATIVE
IMM GRANULOCYTES # BLD AUTO: 0.02 K/UL
IMM GRANULOCYTES NFR BLD: 0 %
INFLUENZA A BY PCR: NOT DETECTED
INFLUENZA B BY PCR: NOT DETECTED
KETONES UR STRIP-MCNC: 15 MG/DL
LEUKOCYTE ESTERASE UR QL STRIP: ABNORMAL
LYMPHOCYTES NFR BLD: 1.55 K/UL
LYMPHOCYTES RELATIVE PERCENT: 16 % (ref 24–44)
MAGNESIUM SERPL-MCNC: 2 MG/DL (ref 1.8–2.4)
MCH RBC QN AUTO: 27.8 PG (ref 27–31)
MCHC RBC AUTO-ENTMCNC: 32.3 G/DL (ref 32–36)
MCV RBC AUTO: 85.9 FL (ref 78–100)
MONOCYTES NFR BLD: 0.86 K/UL
MONOCYTES NFR BLD: 9 % (ref 0–4)
NEUTROPHILS NFR BLD: 72 % (ref 36–66)
NEUTS SEG NFR BLD: 6.89 K/UL
NITRITE UR QL STRIP: NEGATIVE
PH UR STRIP: 6 [PH] (ref 5–8)
PLATELET # BLD AUTO: 345 K/UL (ref 140–440)
PMV BLD AUTO: 10.2 FL (ref 7.5–11.1)
POTASSIUM SERPL-SCNC: 3.5 MMOL/L (ref 3.5–5.1)
PROT SERPL-MCNC: 7.2 G/DL (ref 6.4–8.2)
PROT UR STRIP-MCNC: NEGATIVE MG/DL
RBC # BLD AUTO: 5.4 M/UL (ref 4.2–5.4)
RBC #/AREA URNS HPF: ABNORMAL /HPF
S PYO AG THROAT QL: NEGATIVE
SARS-COV-2 RDRP RESP QL NAA+PROBE: NOT DETECTED
SODIUM SERPL-SCNC: 139 MMOL/L (ref 135–145)
SP GR UR STRIP: 1.01 (ref 1–1.03)
SPECIMEN DESCRIPTION: NORMAL
SPECIMEN SOURCE: NORMAL
UROBILINOGEN UR STRIP-ACNC: 0.2 EU/DL (ref 0–1)
WBC #/AREA URNS HPF: ABNORMAL /HPF
WBC OTHER # BLD: 9.5 K/UL (ref 4–10.5)

## 2025-01-09 PROCEDURE — 36415 COLL VENOUS BLD VENIPUNCTURE: CPT

## 2025-01-09 PROCEDURE — 87086 URINE CULTURE/COLONY COUNT: CPT

## 2025-01-09 PROCEDURE — 82962 GLUCOSE BLOOD TEST: CPT

## 2025-01-09 PROCEDURE — 81001 URINALYSIS AUTO W/SCOPE: CPT

## 2025-01-09 PROCEDURE — 84702 CHORIONIC GONADOTROPIN TEST: CPT

## 2025-01-09 PROCEDURE — 6360000002 HC RX W HCPCS: Performed by: STUDENT IN AN ORGANIZED HEALTH CARE EDUCATION/TRAINING PROGRAM

## 2025-01-09 PROCEDURE — 2580000003 HC RX 258: Performed by: STUDENT IN AN ORGANIZED HEALTH CARE EDUCATION/TRAINING PROGRAM

## 2025-01-09 PROCEDURE — 96374 THER/PROPH/DIAG INJ IV PUSH: CPT

## 2025-01-09 PROCEDURE — 87635 SARS-COV-2 COVID-19 AMP PRB: CPT

## 2025-01-09 PROCEDURE — 87880 STREP A ASSAY W/OPTIC: CPT

## 2025-01-09 PROCEDURE — 87502 INFLUENZA DNA AMP PROBE: CPT

## 2025-01-09 PROCEDURE — 80053 COMPREHEN METABOLIC PANEL: CPT

## 2025-01-09 PROCEDURE — 99284 EMERGENCY DEPT VISIT MOD MDM: CPT

## 2025-01-09 PROCEDURE — 87081 CULTURE SCREEN ONLY: CPT

## 2025-01-09 PROCEDURE — 6370000000 HC RX 637 (ALT 250 FOR IP): Performed by: STUDENT IN AN ORGANIZED HEALTH CARE EDUCATION/TRAINING PROGRAM

## 2025-01-09 PROCEDURE — 85025 COMPLETE CBC W/AUTO DIFF WBC: CPT

## 2025-01-09 PROCEDURE — 83735 ASSAY OF MAGNESIUM: CPT

## 2025-01-09 PROCEDURE — 96375 TX/PRO/DX INJ NEW DRUG ADDON: CPT

## 2025-01-09 RX ORDER — 0.9 % SODIUM CHLORIDE 0.9 %
1000 INTRAVENOUS SOLUTION INTRAVENOUS ONCE
Status: COMPLETED | OUTPATIENT
Start: 2025-01-09 | End: 2025-01-09

## 2025-01-09 RX ORDER — LOSARTAN POTASSIUM 50 MG/1
50 TABLET ORAL DAILY
COMMUNITY

## 2025-01-09 RX ORDER — ONDANSETRON 2 MG/ML
4 INJECTION INTRAMUSCULAR; INTRAVENOUS ONCE
Status: COMPLETED | OUTPATIENT
Start: 2025-01-09 | End: 2025-01-09

## 2025-01-09 RX ORDER — ONDANSETRON 4 MG/1
4 TABLET, ORALLY DISINTEGRATING ORAL 3 TIMES DAILY PRN
Qty: 21 TABLET | Refills: 0 | Status: SHIPPED | OUTPATIENT
Start: 2025-01-09

## 2025-01-09 RX ORDER — ACETAMINOPHEN 500 MG
1000 TABLET ORAL ONCE
Status: COMPLETED | OUTPATIENT
Start: 2025-01-09 | End: 2025-01-09

## 2025-01-09 RX ORDER — KETOROLAC TROMETHAMINE 30 MG/ML
30 INJECTION, SOLUTION INTRAMUSCULAR; INTRAVENOUS ONCE
Status: COMPLETED | OUTPATIENT
Start: 2025-01-09 | End: 2025-01-09

## 2025-01-09 RX ORDER — TRAZODONE HYDROCHLORIDE 50 MG/1
50 TABLET, FILM COATED ORAL NIGHTLY
COMMUNITY

## 2025-01-09 RX ADMIN — KETOROLAC TROMETHAMINE 30 MG: 30 INJECTION, SOLUTION INTRAMUSCULAR at 13:30

## 2025-01-09 RX ADMIN — ONDANSETRON 4 MG: 2 INJECTION, SOLUTION INTRAMUSCULAR; INTRAVENOUS at 13:27

## 2025-01-09 RX ADMIN — ACETAMINOPHEN 1000 MG: 500 TABLET ORAL at 13:29

## 2025-01-09 RX ADMIN — SODIUM CHLORIDE 1000 ML: 9 INJECTION, SOLUTION INTRAVENOUS at 13:26

## 2025-01-09 ASSESSMENT — PAIN SCALES - GENERAL: PAINLEVEL_OUTOF10: 8

## 2025-01-09 ASSESSMENT — PAIN DESCRIPTION - LOCATION: LOCATION: CHEST

## 2025-01-09 ASSESSMENT — LIFESTYLE VARIABLES: HOW OFTEN DO YOU HAVE A DRINK CONTAINING ALCOHOL: NEVER

## 2025-01-09 ASSESSMENT — PAIN - FUNCTIONAL ASSESSMENT: PAIN_FUNCTIONAL_ASSESSMENT: NONE - DENIES PAIN

## 2025-01-09 NOTE — ED PROVIDER NOTES
0.17   Basophils Absolute 0.04   Immature Granulocytes Absolute 0.02  No leukocytosis, neutrophilia or bandemia.  Normal hemoglobin and platelets. [SC]   1327 SARS-CoV-2, Rapid: Not Detected  Negative COVID test [SC]   1327 POC Glucose(!): 100  Overall normal point-of-care glucose. [SC]   1328 Influenza A and B:    Influenza A by PCR Not Detected   Influenza B by PCR Not Detected  Negative influenza [SC]   1440 Strep A Ag: NEGATIVE  Negative strep test [SC]   1441 Microscopic Urinalysis(!):    WBC, UA 10 TO 20(!)   RBC, UA 0 TO 2   Epithelial Cells, UA 6 TO 9(!)   Bacteria, UA FEW(!)   Other observations Urine Reflexed to Culture(!)  Contaminated urinalysis, sent for culture.  Given the absence of signs of inflammatory response, and irritative urinary symptoms, unlikely to represent UTI. [SC]   1442 Comprehensive Metabolic Panel w/ Reflex to MG(!):    Sodium 139   Potassium 3.5   Chloride 103   CARBON DIOXIDE 20(!)   Anion Gap 16   Glucose 103(!)   BUN,BUNPL 13   Creatinine 0.6   Est, Glom Filt Rate >90   Calcium 8.7   Total Protein 7.2   Albumin 4.2   Albumin/Globulin Ratio 1.4   Total Bilirubin 0.5   Alkaline Phosphatase 99   ALT 13   AST 22  Normal electrolytes, normal renal and hepatic function, normal anion gap and serum glucose. [SC]   1442 Reassessed the patient, she is in good clinical conditions, has remained hemodynamically stable.  Endorses improvement of her symptoms, seems more calm.  Considering a reassuring workup and current clinical condition, at this time no need for further interventions in hospital.  Instructed her to resume her medical care as outpatient via her PCP, and to follow-up the culture results.Gave her precautions to come apparently present when becomes necessary.  She will be discharged. [SC]      ED Course User Index  [SC] Yovanny Kennedy MD         History from : Patient and Family      Limitations to history : None    Patient was given the following

## 2025-01-09 NOTE — DISCHARGE INSTRUCTIONS
If at some point you have severe shortness of breath, severe nausea or vomiting unable to keep anything down, feels severely weak unable to stand up, feels confused or are lethargic unable to do your normal daily activities, or have any other concerning symptoms, please come back promptly to the emergency department.    Please follow-up the results of your urine culture results via MyChart or through your PCP.

## 2025-01-10 LAB
MICROORGANISM SPEC CULT: NORMAL
SPECIMEN DESCRIPTION: NORMAL

## 2025-01-12 LAB
MICROORGANISM SPEC CULT: NORMAL
SPECIMEN DESCRIPTION: NORMAL

## 2025-06-23 ENCOUNTER — HOSPITAL ENCOUNTER (EMERGENCY)
Age: 40
Discharge: HOME | End: 2025-06-23
Attending: STUDENT IN AN ORGANIZED HEALTH CARE EDUCATION/TRAINING PROGRAM
Payer: COMMERCIAL

## 2025-06-23 VITALS
TEMPERATURE: 98.5 F | DIASTOLIC BLOOD PRESSURE: 86 MMHG | HEART RATE: 87 BPM | HEIGHT: 63 IN | BODY MASS INDEX: 53.5 KG/M2 | SYSTOLIC BLOOD PRESSURE: 136 MMHG | OXYGEN SATURATION: 95 % | RESPIRATION RATE: 16 BRPM

## 2025-06-23 DIAGNOSIS — K52.9 GASTROENTERITIS: Primary | ICD-10-CM

## 2025-06-23 PROCEDURE — 6370000000 HC RX 637 (ALT 250 FOR IP): Performed by: STUDENT IN AN ORGANIZED HEALTH CARE EDUCATION/TRAINING PROGRAM

## 2025-06-23 PROCEDURE — 99283 EMERGENCY DEPT VISIT LOW MDM: CPT

## 2025-06-23 RX ORDER — SCOPOLAMINE 1 MG/3D
1 PATCH, EXTENDED RELEASE TRANSDERMAL ONCE
Status: DISCONTINUED | OUTPATIENT
Start: 2025-06-23 | End: 2025-06-23 | Stop reason: HOSPADM

## 2025-06-23 RX ORDER — IBUPROFEN 600 MG/1
600 TABLET, FILM COATED ORAL ONCE
Status: COMPLETED | OUTPATIENT
Start: 2025-06-23 | End: 2025-06-23

## 2025-06-23 RX ORDER — LOPERAMIDE HYDROCHLORIDE 2 MG/1
2 CAPSULE ORAL ONCE
Status: COMPLETED | OUTPATIENT
Start: 2025-06-23 | End: 2025-06-23

## 2025-06-23 RX ORDER — ONDANSETRON 4 MG/1
4 TABLET, ORALLY DISINTEGRATING ORAL 3 TIMES DAILY PRN
Qty: 15 TABLET | Refills: 0 | Status: SHIPPED | OUTPATIENT
Start: 2025-06-23

## 2025-06-23 RX ORDER — DICYCLOMINE HYDROCHLORIDE 10 MG/1
10 CAPSULE ORAL ONCE
Status: COMPLETED | OUTPATIENT
Start: 2025-06-23 | End: 2025-06-23

## 2025-06-23 RX ORDER — ONDANSETRON 4 MG/1
4 TABLET, ORALLY DISINTEGRATING ORAL ONCE
Status: COMPLETED | OUTPATIENT
Start: 2025-06-23 | End: 2025-06-23

## 2025-06-23 RX ORDER — DICYCLOMINE HCL 20 MG
20 TABLET ORAL EVERY 8 HOURS PRN
Qty: 21 TABLET | Refills: 0 | Status: SHIPPED | OUTPATIENT
Start: 2025-06-23

## 2025-06-23 RX ADMIN — ONDANSETRON 4 MG: 4 TABLET, ORALLY DISINTEGRATING ORAL at 05:53

## 2025-06-23 RX ADMIN — DICYCLOMINE HYDROCHLORIDE 10 MG: 10 CAPSULE ORAL at 05:53

## 2025-06-23 RX ADMIN — IBUPROFEN 600 MG: 600 TABLET ORAL at 05:54

## 2025-06-23 RX ADMIN — LOPERAMIDE HYDROCHLORIDE 2 MG: 2 CAPSULE ORAL at 05:53

## 2025-06-23 ASSESSMENT — PAIN DESCRIPTION - LOCATION
LOCATION: ABDOMEN

## 2025-06-23 ASSESSMENT — PAIN SCALES - GENERAL
PAINLEVEL_OUTOF10: 8

## 2025-06-23 ASSESSMENT — PAIN DESCRIPTION - PAIN TYPE
TYPE: ACUTE PAIN
TYPE: ACUTE PAIN

## 2025-06-23 ASSESSMENT — PAIN DESCRIPTION - DESCRIPTORS
DESCRIPTORS: STABBING

## 2025-06-23 ASSESSMENT — PAIN - FUNCTIONAL ASSESSMENT
PAIN_FUNCTIONAL_ASSESSMENT: 0-10
PAIN_FUNCTIONAL_ASSESSMENT: 0-10
PAIN_FUNCTIONAL_ASSESSMENT: ACTIVITIES ARE NOT PREVENTED
PAIN_FUNCTIONAL_ASSESSMENT: PREVENTS OR INTERFERES SOME ACTIVE ACTIVITIES AND ADLS
PAIN_FUNCTIONAL_ASSESSMENT: ACTIVITIES ARE NOT PREVENTED

## 2025-06-23 NOTE — ED PROVIDER NOTES
capsule 2 mg (2 mg Oral Given 6/23/25 0553)   ondansetron (ZOFRAN-ODT) disintegrating tablet 4 mg (4 mg Oral Given 6/23/25 0553)       Social Determinants : None    Records Reviewed : Outpatient Notes   and Outpatient Labs & Studies        Disposition: Discharge  Discharge condition: Stable    I am the Primary Clinician of Record.    Clinical Impression:  1. Gastroenteritis      Disposition referral (if applicable):  No follow-up provider specified.  Disposition medications (if applicable):  Current Discharge Medication List        START taking these medications    Details   dicyclomine (BENTYL) 20 MG tablet Take 1 tablet by mouth every 8 hours as needed (Abdominal cramping)  Qty: 21 tablet, Refills: 0           ED Provider Disposition Time  DISPOSITION Decision To Discharge 06/23/2025 05:50:51 AM   DISPOSITION CONDITION Stable           Comment: Please note this report has been produced using speech recognition software and may contain errors related to that system including errors in grammar, punctuation, and spelling, as well as words and phrases that may be inappropriate.  Efforts were made to edit the dictations.        Yovanny Kennedy MD  06/23/25 0556

## 2025-06-23 NOTE — DISCHARGE INSTRUCTIONS
You were seen in the emergency department for diarrhea and abdominal discomfort.  Here, your vital signs were within normal limits, and the physical exam, unrevealing of tenderness, was overall benign.  We discussed options, and agreed on providing empiric treatments to help you feel better.    I recommend to take loperamide orally every 6-8 hours (3-4 times daily) for the next 3 days, or until diarrhea started slowing down.    For nausea, I sent a refill of a medication that had been already prescribed to you, called ondansetron.  Please in your pharmacy and take it as needed every 8 hours.      For cramping and abdominal discomfort, I prescribed the medication dicyclomine, that you can take up to every 8 hours if necessary.    For pain, you can take ibuprofen 600-800 milligrams every 8 hours, alternating it every 4 hours with acetaminophen 975 mg taken every 8 hours.  Therefore, if you take acetaminophen at noon, then take ibuprofen at 4 PM, then acetaminophen again at 8 PM, then ibuprofen at midnight, and so on.    If at some point you have severe shortness of breath, severe nausea or vomiting unable to keep anything down, feels severely weak unable to stand up, feels confused or are lethargic unable to do your normal daily activities, or have any other concerning symptoms, please come back promptly to the emergency department.

## 2025-07-08 ENCOUNTER — HOSPITAL ENCOUNTER (EMERGENCY)
Age: 40
Discharge: HOME | End: 2025-07-08
Attending: EMERGENCY MEDICINE
Payer: COMMERCIAL

## 2025-07-08 ENCOUNTER — APPOINTMENT (OUTPATIENT)
Dept: GENERAL RADIOLOGY | Age: 40
End: 2025-07-08
Payer: COMMERCIAL

## 2025-07-08 VITALS
HEART RATE: 92 BPM | BODY MASS INDEX: 49.61 KG/M2 | TEMPERATURE: 98.2 F | RESPIRATION RATE: 18 BRPM | OXYGEN SATURATION: 98 % | DIASTOLIC BLOOD PRESSURE: 93 MMHG | HEIGHT: 63 IN | WEIGHT: 280 LBS | SYSTOLIC BLOOD PRESSURE: 164 MMHG

## 2025-07-08 DIAGNOSIS — S89.92XA INJURY OF LIGAMENT OF LEFT KNEE, INITIAL ENCOUNTER: Primary | ICD-10-CM

## 2025-07-08 DIAGNOSIS — S09.93XA DENTAL INJURY, INITIAL ENCOUNTER: ICD-10-CM

## 2025-07-08 DIAGNOSIS — M25.562 ACUTE PAIN OF LEFT KNEE: ICD-10-CM

## 2025-07-08 PROCEDURE — 6370000000 HC RX 637 (ALT 250 FOR IP): Performed by: EMERGENCY MEDICINE

## 2025-07-08 PROCEDURE — 99284 EMERGENCY DEPT VISIT MOD MDM: CPT

## 2025-07-08 PROCEDURE — 73502 X-RAY EXAM HIP UNI 2-3 VIEWS: CPT

## 2025-07-08 PROCEDURE — 73562 X-RAY EXAM OF KNEE 3: CPT

## 2025-07-08 RX ORDER — HYDROCODONE BITARTRATE AND ACETAMINOPHEN 5; 325 MG/1; MG/1
1-2 TABLET ORAL EVERY 6 HOURS PRN
Qty: 24 TABLET | Refills: 0 | Status: SHIPPED | OUTPATIENT
Start: 2025-07-08 | End: 2025-07-09

## 2025-07-08 RX ORDER — HYDROCODONE BITARTRATE AND ACETAMINOPHEN 5; 325 MG/1; MG/1
2 TABLET ORAL ONCE
Status: COMPLETED | OUTPATIENT
Start: 2025-07-08 | End: 2025-07-08

## 2025-07-08 RX ORDER — KETOROLAC TROMETHAMINE 10 MG/1
10 TABLET, FILM COATED ORAL EVERY 8 HOURS PRN
Qty: 12 TABLET | Refills: 0 | Status: SHIPPED | OUTPATIENT
Start: 2025-07-08 | End: 2025-07-12

## 2025-07-08 RX ADMIN — HYDROCODONE BITARTRATE AND ACETAMINOPHEN 2 TABLET: 5; 325 TABLET ORAL at 20:45

## 2025-07-08 RX ADMIN — TIZANIDINE 4 MG: 4 TABLET ORAL at 20:45

## 2025-07-08 ASSESSMENT — LIFESTYLE VARIABLES
HOW OFTEN DO YOU HAVE A DRINK CONTAINING ALCOHOL: NEVER
HOW MANY STANDARD DRINKS CONTAINING ALCOHOL DO YOU HAVE ON A TYPICAL DAY: PATIENT DOES NOT DRINK

## 2025-07-08 ASSESSMENT — PAIN SCALES - GENERAL
PAINLEVEL_OUTOF10: 9
PAINLEVEL_OUTOF10: 9

## 2025-07-08 ASSESSMENT — ENCOUNTER SYMPTOMS
RESPIRATORY NEGATIVE: 1
EYES NEGATIVE: 1
GASTROINTESTINAL NEGATIVE: 1

## 2025-07-08 ASSESSMENT — PAIN - FUNCTIONAL ASSESSMENT
PAIN_FUNCTIONAL_ASSESSMENT: 0-10
PAIN_FUNCTIONAL_ASSESSMENT: PREVENTS OR INTERFERES SOME ACTIVE ACTIVITIES AND ADLS
PAIN_FUNCTIONAL_ASSESSMENT: PREVENTS OR INTERFERES SOME ACTIVE ACTIVITIES AND ADLS

## 2025-07-08 ASSESSMENT — PAIN DESCRIPTION - DESCRIPTORS
DESCRIPTORS: SHARP
DESCRIPTORS: SHARP

## 2025-07-08 ASSESSMENT — PAIN DESCRIPTION - FREQUENCY: FREQUENCY: CONTINUOUS

## 2025-07-08 ASSESSMENT — PAIN DESCRIPTION - ONSET: ONSET: SUDDEN

## 2025-07-08 ASSESSMENT — PAIN DESCRIPTION - ORIENTATION
ORIENTATION: LEFT
ORIENTATION: LEFT;UPPER;ANTERIOR

## 2025-07-08 ASSESSMENT — PAIN DESCRIPTION - PAIN TYPE: TYPE: ACUTE PAIN

## 2025-07-09 RX ORDER — HYDROCODONE BITARTRATE AND ACETAMINOPHEN 5; 325 MG/1; MG/1
1-2 TABLET ORAL EVERY 6 HOURS PRN
Qty: 24 TABLET | Refills: 0 | Status: SHIPPED | OUTPATIENT
Start: 2025-07-09 | End: 2025-07-12

## 2025-07-09 NOTE — ED PROVIDER NOTES
7/9/25  0828am    We were called by patient's [facility] pharmacy that she had presented with her printed prescriptions.  She saw Dr. Mckee over the evening.  She had the Toradol and Zanaflex prescriptions that were signed but the Norco apparently they had forgotten to sign.  It does appear that he had prescribed all 3 of these medications last evening.  I did send in the electronic prescription to her [facility] pharmacy at this time.\  They also note that she is on Celebrex, which she had not told us, they were asking if they should fill the Toradol and I advised them not to.     Yessenia Toribio MD  07/09/25 2927

## 2025-07-09 NOTE — DISCHARGE INSTR - COC
Continuity of Care Form    Patient Name: Víctor Tariq   :  1985  MRN:  [id number]    Admit date:  2025  Discharge date:  ***    Code Status Order: No Order   Advance Directives:     Admitting Physician:  No admitting provider for patient encounter.  PCP: Fartun Vasquez APRN - NP    Discharging Nurse: ***  Discharging Hospital Unit/Room#:   Discharging Unit Phone Number: ***    Emergency Contact:   Extended Emergency Contact Information  Primary Emergency Contact: [place] [address] [facility]  Home Phone: [phone]  Relation: Other    Past Surgical History:  History reviewed. No pertinent surgical history.    Immunization History:   Immunization History   Administered Date(s) Administered    COVID-19, PFIZER GRAY top, DO NOT Dilute, (age 12 y+), IM, 30 mcg/0.3 mL 2022, 2022       Active Problems:  Patient Active Problem List   Diagnosis Code    Ganglion of tendon sheath M67.40       Isolation/Infection:   Isolation            No Isolation          Patient Infection Status    None to display              Nurse Assessment:  Last Vital Signs: BP (!) 164/93   Pulse 92   Temp 98.2 °F (36.8 °C) (Oral)   Resp 18   Ht 1.6 m (5' 3\")   Wt 127 kg (280 lb)   SpO2 98%   BMI 49.60 kg/m²     Last documented pain score (0-10 scale): Pain Level: 9  Last Weight:   Wt Readings from Last 1 Encounters:   25 127 kg (280 lb)     Mental Status:  {IP PT MENTAL STATUS:36903}    IV Access:  { BUDDY IV ACCESS:949540411}    Nursing Mobility/ADLs:  Walking   {CHP DME ADLs:161529899}  Transfer  {CHP DME ADLs:027957444}  Bathing  {CHP DME ADLs:500862019}  Dressing  {CHP DME ADLs:441812994}  Toileting  {CHP DME ADLs:809435783}  Feeding  {CHP DME ADLs:264424021}  Med Admin  {CHP DME ADLs:831630971}  Med Delivery   { BUDDY MED Delivery:968082389}    Wound Care Documentation and Therapy:        Elimination:  Continence:   Bowel: {YES / NO:}  Bladder: {YES /

## 2025-07-09 NOTE — ED PROVIDER NOTES
The history is provided by the patient.   Knee Problem  Patient tripped over a boot that was laying on the floor and she hit her upper tooth against the side of the wall causing it to be chipped but the patient states that she is currently having left knee pain and pressure she also has some pain up into her left hip.  She did not have any loss of consciousness and did not suffer any other injuries besides the knee hip and chipped tooth    Review of Systems   Constitutional: Negative.    HENT: Negative.     Eyes: Negative.    Respiratory: Negative.     Cardiovascular: Negative.    Gastrointestinal: Negative.    Genitourinary: Negative.    Musculoskeletal: Negative.    Skin: Negative.    Neurological: Negative.    All other systems reviewed and are negative.      History reviewed. No pertinent family history.  Social History     Socioeconomic History    Marital status: Single     Spouse name: Not on file    Number of children: Not on file    Years of education: Not on file    Highest education level: Not on file   Occupational History    Not on file   Tobacco Use    Smoking status: Every Day     Current packs/day: 0.50     Types: Cigarettes    Smokeless tobacco: Never   Vaping Use    Vaping status: Never Used   Substance and Sexual Activity    Alcohol use: No    Drug use: No    Sexual activity: Yes     Partners: Male   Other Topics Concern    Not on file   Social History Narrative    Not on file     Social Drivers of Health     Financial Resource Strain: Not on file   Food Insecurity: Not on file   Transportation Needs: Not on file   Physical Activity: Not on file   Stress: Not on file   Social Connections: Not on file   Intimate Partner Violence: Not on file   Housing Stability: Not on file     History reviewed. No pertinent surgical history.  Past Medical History:   Diagnosis Date    Anxiety     Asthma     Depression     Hypertension      Allergies   Allergen Reactions    Bactrim [Sulfamethoxazole-Trimethoprim]

## 2025-07-09 NOTE — ED NOTES
[facility] patient education on correctly utilizing crutches printed and reviewed with patient and significant other. Patient demonstrated understanding by return demonstration of crutches in patient room before discharge. Crutches and knee splint adjusted for optimal fit for safe utilization before discharge. Knee splint applied to left knee, patient verbalized understanding of correct positioning and utilization of splint. Patient assisted to car with wheelchair.

## 2025-07-10 ENCOUNTER — OFFICE VISIT (OUTPATIENT)
Dept: ORTHOPEDIC SURGERY | Age: 40
End: 2025-07-10
Payer: COMMERCIAL

## 2025-07-10 VITALS — BODY MASS INDEX: 49.6 KG/M2 | OXYGEN SATURATION: 98 % | HEART RATE: 82 BPM | HEIGHT: 63 IN

## 2025-07-10 DIAGNOSIS — S86.812A RUPTURE OF LEFT PATELLAR TENDON, INITIAL ENCOUNTER: Primary | ICD-10-CM

## 2025-07-10 DIAGNOSIS — S89.92XA INJURY OF LEFT KNEE, INITIAL ENCOUNTER: ICD-10-CM

## 2025-07-10 PROCEDURE — G8417 CALC BMI ABV UP PARAM F/U: HCPCS | Performed by: PHYSICIAN ASSISTANT

## 2025-07-10 PROCEDURE — 4004F PT TOBACCO SCREEN RCVD TLK: CPT | Performed by: PHYSICIAN ASSISTANT

## 2025-07-10 PROCEDURE — 99203 OFFICE O/P NEW LOW 30 MIN: CPT | Performed by: PHYSICIAN ASSISTANT

## 2025-07-10 PROCEDURE — G8427 DOCREV CUR MEDS BY ELIG CLIN: HCPCS | Performed by: PHYSICIAN ASSISTANT

## 2025-07-10 NOTE — PROGRESS NOTES
Pt presents to the office with an injury to her left knee. Pt states she just got out of a cam boot and tripped over it and fell into her dresser. Pt states she went to the ED and was placed into a brace and has been using crutches. Pt states she has remained non- weight barring since the injury. Pt states she has been applying ice and elevating her left knee. Pt states she has some swelling from the injury but over the last two day the swelling has started to decrease. Pt states she was also seeing podiatry for her right foot and when she fell she aggravated her foot. Pt states the ED prescribed her a muscle relaxer and pain medication. Pt rates her pain today 8/10.  
DEMOGRAPHICS: 40 years old Female INDICATION: trauma Pertinent imaging sent Pt. tripped and fell over boot hit left front upper tooth on dresser and chipped it. C/o left hip and left knee pain COMPARISON: No existing relevant imaging study corresponding to the same anatomical region is available. FINDINGS: Left hip: 2 views of the left hip are provided. Bony mineralization is within normal limits with no evidence of acute fracture or dislocation. Alignment of the left hip joint maintained. Left knee: There is at the left knee were provided. Bony mineralization is within normal limits. No evidence of acute fracture or dislocation. Alignment of  the knee joint maintained. Joint spaces are preserved. Prevertebral soft tissues are within normal limits. IMPRESSION:  1.  No acute findings This dictation was created with voice recognition software.  While attempts have  been made to review the dictation as it is transcribed, on occasion the spoken word can be misinterpreted by the technology leading to omissions or inappropriate words, phrases or sentences.  Dictated and Electronically Signed By: Margot Drummond MD [facility] 7/8/2025 20:32        XR KNEE LEFT (3 VIEWS)  Result Date: 7/8/2025  PROCEDURE: XR KNEE LEFT (3 VIEWS), XR HIP LEFT (2-3 VIEWS) DATE OF EXAM:  7/8/2025 20:02 DEMOGRAPHICS: 40 years old Female INDICATION: trauma Pertinent imaging sent Pt. tripped and fell over boot hit left front upper tooth on dresser and chipped it. C/o left hip and left knee pain COMPARISON: No existing relevant imaging study corresponding to the same anatomical region is available. FINDINGS: Left hip: 2 views of the left hip are provided. Bony mineralization is within normal limits with no evidence of acute fracture or dislocation. Alignment of the left hip joint maintained. Left knee: There is at the left knee were provided. Bony mineralization is within normal limits. No evidence of acute fracture or

## 2025-07-10 NOTE — PATIENT INSTRUCTIONS
Weight bare are as tolerated while in brace  Remain off work for 2 weeks  New brace fitted today    Central scheduling [phone] will be calling you to schedule your MRI.  If you do not hear from them with in a week give them a call.   Follow up in 3 weeks to discuss the results of the MRI ordered.

## 2025-07-16 ENCOUNTER — HOSPITAL ENCOUNTER (OUTPATIENT)
Dept: MRI IMAGING | Age: 40
Discharge: HOME | End: 2025-07-16
Payer: COMMERCIAL

## 2025-07-16 DIAGNOSIS — S89.92XA INJURY OF LEFT KNEE, INITIAL ENCOUNTER: ICD-10-CM

## 2025-07-16 DIAGNOSIS — S86.812A RUPTURE OF LEFT PATELLAR TENDON, INITIAL ENCOUNTER: ICD-10-CM

## 2025-07-16 PROCEDURE — 73721 MRI JNT OF LWR EXTRE W/O DYE: CPT

## 2025-07-18 ENCOUNTER — OFFICE VISIT (OUTPATIENT)
Dept: ORTHOPEDIC SURGERY | Age: 40
End: 2025-07-18
Payer: COMMERCIAL

## 2025-07-18 VITALS
HEART RATE: 88 BPM | OXYGEN SATURATION: 98 % | BODY MASS INDEX: 51.91 KG/M2 | RESPIRATION RATE: 15 BRPM | WEIGHT: 293 LBS | HEIGHT: 63 IN

## 2025-07-18 DIAGNOSIS — S80.02XA CONTUSION OF LEFT PATELLA, INITIAL ENCOUNTER: Primary | ICD-10-CM

## 2025-07-18 DIAGNOSIS — M94.262 CHONDROMALACIA OF KNEE, LEFT: ICD-10-CM

## 2025-07-18 PROCEDURE — 4004F PT TOBACCO SCREEN RCVD TLK: CPT | Performed by: PHYSICIAN ASSISTANT

## 2025-07-18 PROCEDURE — 99212 OFFICE O/P EST SF 10 MIN: CPT | Performed by: PHYSICIAN ASSISTANT

## 2025-07-18 PROCEDURE — G8427 DOCREV CUR MEDS BY ELIG CLIN: HCPCS | Performed by: PHYSICIAN ASSISTANT

## 2025-07-18 PROCEDURE — G8417 CALC BMI ABV UP PARAM F/U: HCPCS | Performed by: PHYSICIAN ASSISTANT

## 2025-07-18 NOTE — PROGRESS NOTES
Patient seen in office today for Left knee mri results   DOI:07/08/2025   Patient reports 6/10 pain.  RICE and medication are effective to alleviate pain and reduce swelling.   Pain worsened by: Patient reports painful ROM & weight bearing.   MRI performed 07/16/2025, impression below.  IMPRESSION:  1.   Nonspecific prepatellar soft tissue edema. No discrete tear of the extensor   mechanism is noted  2. Medial and lateral compartment chondromalacia  3. No additional definitive evidence of meniscal tear or internal derangement   allowing for motion degradation

## 2025-07-18 NOTE — PROGRESS NOTES
ORTHOPEDIC SURGERY OFFICE NOTE  CHIEF COMPLAINT:  No chief complaint on file.    HISTORY OF PRESENT ILLNESS:  Víctor Tariq is a 40 y.o. female      (HPI) 40-year-old female presenting back to office for 1 week follow-up to discuss MRI results after injury.  Was seen last week and sustained an injury on 7/8.  She tripped on a cam boot at home and landed awkwardly on her leg injuring the left knee.  Patient had pretty significant tenderness throughout the patellar tendon area, had difficulty with extension.  There was concern for possible patellar tendon rupture.     (Previous HPI) 40-year-old female presenting to to office for ED follow-up regarding left knee injury that took place on 7/8/2025.  Patient states that she tripped on a cam boot at home causing her to land awkwardly injuring her left knee.  Has a hard time remembering the direct mechanism of injury but had immediate pain into the left knee and then had difficulty bearing weight and extending the knee after the injury.  Patient was seen in the emergency department had x-ray imaging of the knee that was negative for any acute bony abnormality however patient still has been limited with extension.  She is been in a knee immobilizer on crutches.  Is taking pain medication and muscle relaxers with minimal relief.    PAST MEDICAL HISTORY:  Past Medical History:   Diagnosis Date    Anxiety     Asthma     Depression     Hypertension        PAST SURGICAL HISTORY:  No past surgical history on file.    SOCIAL HISTORY:  Social History     Occupational History    Not on file   Tobacco Use    Smoking status: Every Day     Current packs/day: 0.50     Types: Cigarettes    Smokeless tobacco: Never   Vaping Use    Vaping status: Never Used   Substance and Sexual Activity    Alcohol use: No    Drug use: No    Sexual activity: Yes     Partners: Male       MEDICATIONS AND ALLERGIES:  No outpatient medications have been marked as taking for the 7/18/25 encounter

## 2025-07-31 ENCOUNTER — PATIENT MESSAGE (OUTPATIENT)
Dept: ORTHOPEDIC SURGERY | Age: 40
End: 2025-07-31

## 2025-07-31 ENCOUNTER — TRANSCRIBE ORDERS (OUTPATIENT)
Dept: ADMINISTRATIVE | Age: 40
End: 2025-07-31

## 2025-07-31 DIAGNOSIS — Z12.31 ENCOUNTER FOR SCREENING MAMMOGRAM FOR MALIGNANT NEOPLASM OF BREAST: Primary | ICD-10-CM

## 2025-08-05 ENCOUNTER — HOSPITAL ENCOUNTER (OUTPATIENT)
Dept: PHYSICAL THERAPY | Age: 40
Setting detail: THERAPIES SERIES
Discharge: HOME | End: 2025-08-05
Payer: COMMERCIAL

## 2025-08-05 PROCEDURE — 97110 THERAPEUTIC EXERCISES: CPT

## 2025-08-05 PROCEDURE — 97530 THERAPEUTIC ACTIVITIES: CPT

## 2025-08-05 PROCEDURE — 97162 PT EVAL MOD COMPLEX 30 MIN: CPT

## 2025-08-07 ENCOUNTER — HOSPITAL ENCOUNTER (OUTPATIENT)
Dept: PHYSICAL THERAPY | Age: 40
Setting detail: THERAPIES SERIES
Discharge: HOME | End: 2025-08-07
Payer: COMMERCIAL

## 2025-08-07 PROCEDURE — 97530 THERAPEUTIC ACTIVITIES: CPT

## 2025-08-07 PROCEDURE — 97110 THERAPEUTIC EXERCISES: CPT

## 2025-08-11 ENCOUNTER — HOSPITAL ENCOUNTER (OUTPATIENT)
Dept: PHYSICAL THERAPY | Age: 40
Discharge: HOME | End: 2025-08-11

## 2025-08-13 ENCOUNTER — TELEPHONE (OUTPATIENT)
Dept: ORTHOPEDIC SURGERY | Age: 40
End: 2025-08-13

## 2025-08-14 ENCOUNTER — HOSPITAL ENCOUNTER (OUTPATIENT)
Dept: PHYSICAL THERAPY | Age: 40
Discharge: HOME | End: 2025-08-14

## 2025-08-18 ENCOUNTER — HOSPITAL ENCOUNTER (OUTPATIENT)
Dept: PHYSICAL THERAPY | Age: 40
Discharge: HOME | End: 2025-08-18

## 2025-08-21 ENCOUNTER — HOSPITAL ENCOUNTER (OUTPATIENT)
Dept: PHYSICAL THERAPY | Age: 40
Discharge: HOME | End: 2025-08-21

## 2025-08-25 ENCOUNTER — HOSPITAL ENCOUNTER (OUTPATIENT)
Dept: PHYSICAL THERAPY | Age: 40
Setting detail: THERAPIES SERIES
Discharge: HOME | End: 2025-08-25
Payer: COMMERCIAL

## 2025-08-25 PROCEDURE — 97110 THERAPEUTIC EXERCISES: CPT

## 2025-08-25 PROCEDURE — 97530 THERAPEUTIC ACTIVITIES: CPT

## 2025-08-27 ENCOUNTER — HOSPITAL ENCOUNTER (OUTPATIENT)
Dept: PHYSICAL THERAPY | Age: 40
Discharge: HOME | End: 2025-08-27

## 2025-08-28 ENCOUNTER — OFFICE VISIT (OUTPATIENT)
Dept: ORTHOPEDIC SURGERY | Age: 40
End: 2025-08-28
Payer: COMMERCIAL

## 2025-08-28 VITALS — HEART RATE: 96 BPM | BODY MASS INDEX: 51.91 KG/M2 | WEIGHT: 293 LBS | HEIGHT: 63 IN | OXYGEN SATURATION: 98 %

## 2025-08-28 DIAGNOSIS — S80.02XA CONTUSION OF LEFT PATELLA, INITIAL ENCOUNTER: Primary | ICD-10-CM

## 2025-08-28 DIAGNOSIS — M94.262 CHONDROMALACIA OF KNEE, LEFT: ICD-10-CM

## 2025-08-28 PROCEDURE — 4004F PT TOBACCO SCREEN RCVD TLK: CPT | Performed by: PHYSICIAN ASSISTANT

## 2025-08-28 PROCEDURE — G8427 DOCREV CUR MEDS BY ELIG CLIN: HCPCS | Performed by: PHYSICIAN ASSISTANT

## 2025-08-28 PROCEDURE — G8417 CALC BMI ABV UP PARAM F/U: HCPCS | Performed by: PHYSICIAN ASSISTANT

## 2025-08-28 PROCEDURE — 99212 OFFICE O/P EST SF 10 MIN: CPT | Performed by: PHYSICIAN ASSISTANT
